# Patient Record
Sex: FEMALE | Race: WHITE | NOT HISPANIC OR LATINO | Employment: OTHER | URBAN - METROPOLITAN AREA
[De-identification: names, ages, dates, MRNs, and addresses within clinical notes are randomized per-mention and may not be internally consistent; named-entity substitution may affect disease eponyms.]

---

## 2023-02-13 PROBLEM — M17.12 LEFT KNEE DJD: Status: ACTIVE | Noted: 2023-02-13

## 2023-02-13 PROBLEM — K21.9 GERD WITHOUT ESOPHAGITIS: Status: ACTIVE | Noted: 2023-02-13

## 2023-02-13 PROBLEM — I10 BENIGN HYPERTENSION: Status: ACTIVE | Noted: 2023-02-13

## 2023-02-13 PROBLEM — N28.1 COMPLEX RENAL CYST: Status: ACTIVE | Noted: 2023-02-13

## 2023-02-13 PROBLEM — E66.811 CLASS 1 OBESITY WITH BODY MASS INDEX (BMI) OF 30.0 TO 30.9 IN ADULT: Status: ACTIVE | Noted: 2023-02-13

## 2023-02-13 PROBLEM — M85.80 OSTEOPENIA: Status: ACTIVE | Noted: 2023-02-13

## 2023-02-13 PROBLEM — E66.9 OBESITY: Status: ACTIVE | Noted: 2023-02-13

## 2023-02-13 PROBLEM — E66.9 MILD OBESITY: Status: ACTIVE | Noted: 2023-02-13

## 2023-02-13 PROBLEM — E87.6 HYPOKALEMIA: Status: ACTIVE | Noted: 2023-02-13

## 2023-02-13 PROBLEM — I83.93 VARICOSE VEINS OF LEGS: Status: ACTIVE | Noted: 2023-02-13

## 2023-02-13 PROBLEM — G45.9 TIA (TRANSIENT ISCHEMIC ATTACK): Status: ACTIVE | Noted: 2023-02-13

## 2023-02-13 PROBLEM — E78.01 FAMILIAL HYPERCHOLESTEREMIA: Status: ACTIVE | Noted: 2023-02-13

## 2023-02-13 PROBLEM — E66.3 OVERWEIGHT WITH BODY MASS INDEX (BMI) OF 29 TO 29.9 IN ADULT: Status: ACTIVE | Noted: 2023-02-13

## 2023-02-13 PROBLEM — I10 HYPERTENSION: Status: ACTIVE | Noted: 2023-02-13

## 2023-02-13 PROBLEM — N28.1 SIMPLE CYST OF KIDNEY: Status: ACTIVE | Noted: 2023-02-13

## 2023-02-13 PROBLEM — E66.9 CLASS 1 OBESITY WITH BODY MASS INDEX (BMI) OF 30.0 TO 30.9 IN ADULT: Status: ACTIVE | Noted: 2023-02-13

## 2023-02-13 PROBLEM — M79.605 PAIN IN BOTH LOWER EXTREMITIES: Status: ACTIVE | Noted: 2023-02-13

## 2023-02-13 PROBLEM — M79.604 PAIN IN BOTH LOWER EXTREMITIES: Status: ACTIVE | Noted: 2023-02-13

## 2023-02-13 PROBLEM — J06.9 VIRAL URI WITH COUGH: Status: ACTIVE | Noted: 2023-02-13

## 2023-02-13 PROBLEM — R10.11 RIGHT UPPER QUADRANT ABDOMINAL PAIN: Status: ACTIVE | Noted: 2023-02-13

## 2023-02-13 PROBLEM — R42 VERTIGO: Status: ACTIVE | Noted: 2023-02-13

## 2023-02-13 PROBLEM — M54.50 LOW BACK PAIN: Status: ACTIVE | Noted: 2023-02-13

## 2023-02-13 PROBLEM — G47.00 INSOMNIA: Status: ACTIVE | Noted: 2023-02-13

## 2023-02-13 PROBLEM — E78.00 HYPERCHOLESTEROLEMIA: Status: ACTIVE | Noted: 2023-02-13

## 2023-02-13 PROBLEM — M25.569 JOINT PAIN, KNEE: Status: ACTIVE | Noted: 2023-02-13

## 2023-02-13 RX ORDER — OLMESARTAN MEDOXOMIL AND HYDROCHLOROTHIAZIDE 20/12.5 20; 12.5 MG/1; MG/1
1 TABLET ORAL
COMMUNITY
Start: 2019-09-24 | End: 2023-05-08

## 2023-03-24 ENCOUNTER — LAB (OUTPATIENT)
Dept: LAB | Facility: LAB | Age: 78
End: 2023-03-24
Payer: MEDICARE

## 2023-03-24 ENCOUNTER — OFFICE VISIT (OUTPATIENT)
Dept: PRIMARY CARE | Facility: CLINIC | Age: 78
End: 2023-03-24
Payer: MEDICARE

## 2023-03-24 VITALS
SYSTOLIC BLOOD PRESSURE: 111 MMHG | WEIGHT: 161.4 LBS | DIASTOLIC BLOOD PRESSURE: 66 MMHG | BODY MASS INDEX: 28.6 KG/M2 | OXYGEN SATURATION: 96 % | HEART RATE: 74 BPM | HEIGHT: 63 IN | RESPIRATION RATE: 18 BRPM

## 2023-03-24 DIAGNOSIS — R53.83 FATIGUE, UNSPECIFIED TYPE: ICD-10-CM

## 2023-03-24 DIAGNOSIS — I10 BENIGN HYPERTENSION: ICD-10-CM

## 2023-03-24 DIAGNOSIS — E55.9 VITAMIN D DEFICIENCY: ICD-10-CM

## 2023-03-24 DIAGNOSIS — Z00.00 HEALTH CARE MAINTENANCE: ICD-10-CM

## 2023-03-24 DIAGNOSIS — E78.01 FAMILIAL HYPERCHOLESTEREMIA: ICD-10-CM

## 2023-03-24 DIAGNOSIS — G47.09 OTHER INSOMNIA: ICD-10-CM

## 2023-03-24 LAB
ALANINE AMINOTRANSFERASE (SGPT) (U/L) IN SER/PLAS: 15 U/L (ref 7–45)
ALBUMIN (G/DL) IN SER/PLAS: 4.4 G/DL (ref 3.4–5)
ALKALINE PHOSPHATASE (U/L) IN SER/PLAS: 53 U/L (ref 33–136)
ANION GAP IN SER/PLAS: 13 MMOL/L (ref 10–20)
ASPARTATE AMINOTRANSFERASE (SGOT) (U/L) IN SER/PLAS: 24 U/L (ref 9–39)
BASOPHILS (10*3/UL) IN BLOOD BY AUTOMATED COUNT: 0.08 X10E9/L (ref 0–0.1)
BASOPHILS/100 LEUKOCYTES IN BLOOD BY AUTOMATED COUNT: 1.1 % (ref 0–2)
BILIRUBIN TOTAL (MG/DL) IN SER/PLAS: 0.9 MG/DL (ref 0–1.2)
CALCIUM (MG/DL) IN SER/PLAS: 9.9 MG/DL (ref 8.6–10.3)
CARBON DIOXIDE, TOTAL (MMOL/L) IN SER/PLAS: 28 MMOL/L (ref 21–32)
CHLORIDE (MMOL/L) IN SER/PLAS: 100 MMOL/L (ref 98–107)
CHOLESTEROL (MG/DL) IN SER/PLAS: 202 MG/DL (ref 0–199)
CHOLESTEROL IN HDL (MG/DL) IN SER/PLAS: 56.9 MG/DL
CHOLESTEROL/HDL RATIO: 3.6
CREATININE (MG/DL) IN SER/PLAS: 0.79 MG/DL (ref 0.5–1.05)
EOSINOPHILS (10*3/UL) IN BLOOD BY AUTOMATED COUNT: 0.12 X10E9/L (ref 0–0.4)
EOSINOPHILS/100 LEUKOCYTES IN BLOOD BY AUTOMATED COUNT: 1.7 % (ref 0–6)
ERYTHROCYTE DISTRIBUTION WIDTH (RATIO) BY AUTOMATED COUNT: 12.2 % (ref 11.5–14.5)
ERYTHROCYTE MEAN CORPUSCULAR HEMOGLOBIN CONCENTRATION (G/DL) BY AUTOMATED: 33.2 G/DL (ref 32–36)
ERYTHROCYTE MEAN CORPUSCULAR VOLUME (FL) BY AUTOMATED COUNT: 93 FL (ref 80–100)
ERYTHROCYTES (10*6/UL) IN BLOOD BY AUTOMATED COUNT: 4.2 X10E12/L (ref 4–5.2)
GFR FEMALE: 78 ML/MIN/1.73M2
GLUCOSE (MG/DL) IN SER/PLAS: 90 MG/DL (ref 74–99)
HEMATOCRIT (%) IN BLOOD BY AUTOMATED COUNT: 38.9 % (ref 36–46)
HEMOGLOBIN (G/DL) IN BLOOD: 12.9 G/DL (ref 12–16)
IMMATURE GRANULOCYTES/100 LEUKOCYTES IN BLOOD BY AUTOMATED COUNT: 0.4 % (ref 0–0.9)
LDL: 132 MG/DL (ref 0–99)
LEUKOCYTES (10*3/UL) IN BLOOD BY AUTOMATED COUNT: 7.2 X10E9/L (ref 4.4–11.3)
LYMPHOCYTES (10*3/UL) IN BLOOD BY AUTOMATED COUNT: 2.22 X10E9/L (ref 0.8–3)
LYMPHOCYTES/100 LEUKOCYTES IN BLOOD BY AUTOMATED COUNT: 30.8 % (ref 13–44)
MONOCYTES (10*3/UL) IN BLOOD BY AUTOMATED COUNT: 0.54 X10E9/L (ref 0.05–0.8)
MONOCYTES/100 LEUKOCYTES IN BLOOD BY AUTOMATED COUNT: 7.5 % (ref 2–10)
NEUTROPHILS (10*3/UL) IN BLOOD BY AUTOMATED COUNT: 4.21 X10E9/L (ref 1.6–5.5)
NEUTROPHILS/100 LEUKOCYTES IN BLOOD BY AUTOMATED COUNT: 58.5 % (ref 40–80)
PLATELETS (10*3/UL) IN BLOOD AUTOMATED COUNT: 239 X10E9/L (ref 150–450)
POTASSIUM (MMOL/L) IN SER/PLAS: 3.9 MMOL/L (ref 3.5–5.3)
PROTEIN TOTAL: 7.1 G/DL (ref 6.4–8.2)
SODIUM (MMOL/L) IN SER/PLAS: 137 MMOL/L (ref 136–145)
THYROTROPIN (MIU/L) IN SER/PLAS BY DETECTION LIMIT <= 0.05 MIU/L: 0.88 MIU/L (ref 0.44–3.98)
TRIGLYCERIDE (MG/DL) IN SER/PLAS: 66 MG/DL (ref 0–149)
UREA NITROGEN (MG/DL) IN SER/PLAS: 15 MG/DL (ref 6–23)
VLDL: 13 MG/DL (ref 0–40)

## 2023-03-24 PROCEDURE — 82607 VITAMIN B-12: CPT

## 2023-03-24 PROCEDURE — 3074F SYST BP LT 130 MM HG: CPT | Performed by: INTERNAL MEDICINE

## 2023-03-24 PROCEDURE — 99213 OFFICE O/P EST LOW 20 MIN: CPT | Performed by: INTERNAL MEDICINE

## 2023-03-24 PROCEDURE — 1036F TOBACCO NON-USER: CPT | Performed by: INTERNAL MEDICINE

## 2023-03-24 PROCEDURE — 36415 COLL VENOUS BLD VENIPUNCTURE: CPT

## 2023-03-24 PROCEDURE — 80061 LIPID PANEL: CPT

## 2023-03-24 PROCEDURE — 85025 COMPLETE CBC W/AUTO DIFF WBC: CPT

## 2023-03-24 PROCEDURE — 80053 COMPREHEN METABOLIC PANEL: CPT

## 2023-03-24 PROCEDURE — 1159F MED LIST DOCD IN RCRD: CPT | Performed by: INTERNAL MEDICINE

## 2023-03-24 PROCEDURE — 3078F DIAST BP <80 MM HG: CPT | Performed by: INTERNAL MEDICINE

## 2023-03-24 PROCEDURE — 82306 VITAMIN D 25 HYDROXY: CPT

## 2023-03-24 PROCEDURE — 84443 ASSAY THYROID STIM HORMONE: CPT

## 2023-03-24 ASSESSMENT — PAIN SCALES - GENERAL: PAINLEVEL: 0-NO PAIN

## 2023-03-24 ASSESSMENT — PATIENT HEALTH QUESTIONNAIRE - PHQ9
2. FEELING DOWN, DEPRESSED OR HOPELESS: NOT AT ALL
1. LITTLE INTEREST OR PLEASURE IN DOING THINGS: NOT AT ALL
SUM OF ALL RESPONSES TO PHQ9 QUESTIONS 1 AND 2: 0

## 2023-03-24 NOTE — PROGRESS NOTES
"  Chief Complaint   Patient presents with    Follow-up    Hypertension     HPI/Patient ID: Philomena Conway is a 77 y.o. female with PMH remarkable for osteopenia, HLD, varicose veins, HTN who presents to the office today for a check up.  -- pt reports that she is feeling more tired than usual for a couple months. Reports insomnia at times.   -- primary caregiver of her  and he is dependent on ADL's.   - murmur on exam. Denies SOB, CP, etc. Will get an echo in the future for a baseline or sooner if symptomatic.      12 point review of systems negative unless stated above in HPI    Review of Systems:  Constitutional: No fever, chills  Eye: No blurry vision  ENT: No nasal congestion  Respiratory: No cough, SOB,  Cardiac: No chest pain or palpitations, dyspnea on exertion or orthopnea  Gastrointestinal: No abdominal pain, nausea, vomiting, diarrhea, melena, hemoptysis or hematochezia.  Genitourinary: No dysuria, hematuria, frequency, urgency  Musculoskeletal: No arthralgia, myalgia, stiffness, weakness  Neurological: No dizziness, lightheadness, blurry vision, headache or syncope  Psychiatric: + history of anxiety, depression, insomnia No hallucinations, suicidal/homicidal ideation  Skin: No rash or pruritus, ulcers  Endocrine: No heat or cold intolerance, polyuria, polydipsia  Hematologic: No bruising, petechiae     Objective   /66   Pulse 74   Resp 18   Ht 1.6 m (5' 3\")   Wt 73.2 kg (161 lb 6.4 oz)   SpO2 96%   BMI 28.59 kg/m²     Physical Exam:  General: Alert and oriented x3, well nourished, no acute distress.  Eye: PERRL, EOMI, normal conjunctiva.  HENT: Normocephalic, clear tympanic membranes, moist oral mucosa, no sinus tenderness.  Neck: Supple, non-tender, no carotid bruits, no JVD, no lymphadenopathy.  Lungs: Clear to auscultation, non-labored respiration.  Heart: Normal rate, regular rhythm, slight murmur, gallop or edema.  Abdomen: Soft, non-tender, non-distended, normal bowel sounds, no " masses.  Musculoskeletal: Normal range of motion and strength, no tenderness or swelling.  Skin: Skin is warm, dry and pink, no rashes or lesions.  Neurologic: Alert & Oriented x3, intact senses, motor, response and reflexes ok, normal strength, CN II-XII intact.  Psychiatric: Cooperative, appropriate mood and affect.   Current Outpatient Medications on File Prior to Visit   Medication Sig Dispense Refill    olmesartan-hydrochlorothiazide (BENIcar HCT) 20-12.5 mg tablet Take 1 tablet by mouth once every day.       No current facility-administered medications on file prior to visit.      Assessment/Plan   Diagnoses and all orders for this visit:  Health care maintenance  Comments:  - fu in 4m or sooner if needed  - check labs  - we will call you with the results  Orders:  -     CBC and Auto Differential; Future  -     Comprehensive metabolic panel; Future  -     Tsh With Reflex To Free T4 If Abnormal; Future  -     Lipid panel; Future  -     Vitamin B12; Future  -     Vitamin D 25-Hydroxy,Total; Future  Benign hypertension  Comments:  - bp stable  - c/w benicar  Orders:  -     CBC and Auto Differential; Future  -     Comprehensive metabolic panel; Future  -     Tsh With Reflex To Free T4 If Abnormal; Future  -     Lipid panel; Future  -     Vitamin B12; Future  -     Vitamin D 25-Hydroxy,Total; Future  Familial hypercholesteremia  Comments:  - will check lipid panel  - not on medication  Orders:  -     CBC and Auto Differential; Future  -     Comprehensive metabolic panel; Future  -     Tsh With Reflex To Free T4 If Abnormal; Future  -     Lipid panel; Future  -     Vitamin B12; Future  -     Vitamin D 25-Hydroxy,Total; Future  Other insomnia  Comments:  - most likely 2/2 caregiver strain  - will check vitamin levels, CBC, CMP,  Orders:  -     CBC and Auto Differential; Future  -     Comprehensive metabolic panel; Future  -     Tsh With Reflex To Free T4 If Abnormal; Future  -     Lipid panel; Future  -     Vitamin  B12; Future  -     Vitamin D 25-Hydroxy,Total; Future  Fatigue, unspecified type  Comments:  - see insomnia  Orders:  -     Vitamin B12; Future  -     Vitamin D 25-Hydroxy,Total; Future  Vitamin D deficiency  Comments:  - at risk for  - check level  Orders:  -     Vitamin D 25-Hydroxy,Total; Future        ------  Written by Harriet Quesada, acting as a scribe for Dr. Nguyen. This note accurately reflects the work and decisions made by Dr. Nguyen.     I, Dr. Nguyen, attest all medical record entries made by the scribe were under my direction and were personally dictated by me. I have reviewed the chart and agree that the record accurately reflects my performance of the history, physical exam, and assessment and plan.

## 2023-03-25 LAB
CALCIDIOL (25 OH VITAMIN D3) (NG/ML) IN SER/PLAS: 81 NG/ML
COBALAMIN (VITAMIN B12) (PG/ML) IN SER/PLAS: 673 PG/ML (ref 211–911)

## 2023-05-06 DIAGNOSIS — I10 ESSENTIAL (PRIMARY) HYPERTENSION: ICD-10-CM

## 2023-05-08 RX ORDER — OLMESARTAN MEDOXOMIL AND HYDROCHLOROTHIAZIDE 20/12.5 20; 12.5 MG/1; MG/1
TABLET ORAL
Qty: 90 TABLET | Refills: 0 | Status: SHIPPED | OUTPATIENT
Start: 2023-05-08 | End: 2023-08-03

## 2023-07-26 ENCOUNTER — OFFICE VISIT (OUTPATIENT)
Dept: PRIMARY CARE | Facility: CLINIC | Age: 78
End: 2023-07-26
Payer: MEDICARE

## 2023-07-26 VITALS
BODY MASS INDEX: 29.13 KG/M2 | DIASTOLIC BLOOD PRESSURE: 68 MMHG | OXYGEN SATURATION: 93 % | HEIGHT: 63 IN | RESPIRATION RATE: 18 BRPM | HEART RATE: 85 BPM | SYSTOLIC BLOOD PRESSURE: 104 MMHG | WEIGHT: 164.4 LBS

## 2023-07-26 DIAGNOSIS — E78.00 HYPERCHOLESTEROLEMIA: ICD-10-CM

## 2023-07-26 DIAGNOSIS — M85.80 OSTEOPENIA, UNSPECIFIED LOCATION: ICD-10-CM

## 2023-07-26 DIAGNOSIS — Z00.00 MEDICARE ANNUAL WELLNESS VISIT, SUBSEQUENT: ICD-10-CM

## 2023-07-26 DIAGNOSIS — I10 HYPERTENSION, UNSPECIFIED TYPE: ICD-10-CM

## 2023-07-26 PROCEDURE — 1126F AMNT PAIN NOTED NONE PRSNT: CPT | Performed by: INTERNAL MEDICINE

## 2023-07-26 PROCEDURE — 1159F MED LIST DOCD IN RCRD: CPT | Performed by: INTERNAL MEDICINE

## 2023-07-26 PROCEDURE — 1036F TOBACCO NON-USER: CPT | Performed by: INTERNAL MEDICINE

## 2023-07-26 PROCEDURE — 3074F SYST BP LT 130 MM HG: CPT | Performed by: INTERNAL MEDICINE

## 2023-07-26 PROCEDURE — 3078F DIAST BP <80 MM HG: CPT | Performed by: INTERNAL MEDICINE

## 2023-07-26 PROCEDURE — 1170F FXNL STATUS ASSESSED: CPT | Performed by: INTERNAL MEDICINE

## 2023-07-26 PROCEDURE — 99214 OFFICE O/P EST MOD 30 MIN: CPT | Performed by: INTERNAL MEDICINE

## 2023-07-26 PROCEDURE — G0439 PPPS, SUBSEQ VISIT: HCPCS | Performed by: INTERNAL MEDICINE

## 2023-07-26 ASSESSMENT — ACTIVITIES OF DAILY LIVING (ADL)
MANAGING_FINANCES: INDEPENDENT
BATHING: INDEPENDENT
TAKING_MEDICATION: INDEPENDENT
GROCERY_SHOPPING: INDEPENDENT
DRESSING: INDEPENDENT
DOING_HOUSEWORK: INDEPENDENT

## 2023-07-26 ASSESSMENT — PAIN SCALES - GENERAL: PAINLEVEL: 0-NO PAIN

## 2023-07-26 NOTE — PATIENT INSTRUCTIONS
It was great to see you in the office today! Here is what we discussed at your visit today:  Please continue to take your current medications   Follow up in four months

## 2023-07-26 NOTE — PROGRESS NOTES
"SUBJECTIVE: She states that she has been feeling very good. She states that she walks a lot and tends to her flower garden. She denies any SOB, chest pain or cough. She states she walks daily around the block near her home. She states she had baker's cyst on back of left knee which was drained in the past, but otherwise has no issues with her joints. She denies any issues with constipation or urinary issues. She denies any depression.     Philomena Conway is a 78 y.o. female with PMH remarkable for HTN, HLD, osteopenia, low back pain who presents to the office today for annual medicare wellness physical.     HEALTH MAINTENANCE:  Smoking: non smoker  Mammogram (40-75): 2019, refused further imaging on previous OV  Pap smear (21-65): n/a  Labs:  3/24/23, normal other than slightly elevated total cholesterol 202 ,   Colonoscopy (45-75): refuses  Lung cancer (55-80 + 30 pack year + smoking/quit in last 15 years):   DEXA (65+, q 2 years): 10/31/18 revealed osteopenia    SOCIAL HISTORY:  Social History     Tobacco Use    Smoking status: Never    Smokeless tobacco: Never   Substance Use Topics    Alcohol use: Never    Drug use: Never       IMMUNIZATIONS:  Immunization History   Administered Date(s) Administered    Influenza, seasonal, injectable 11/10/2022    Pfizer Purple Cap SARS-CoV-2 03/01/2021, 03/22/2021, 10/03/2021    Pneumococcal polysaccharide vaccine, 23-valent, age 2 years and older (PNEUMOVAX 23) 01/01/2012, 03/04/2013    Tdap vaccine, age 10 years and older (BOOSTRIX) 01/01/2012    Zoster, live 01/01/2013     REVIEW OF SYSTEMS:  Review of Systems  12 point review of systems negative unless stated above in HPI    ALLERGIES:  No Known Allergies    VITAL SIGNS:  Vitals:    07/26/23 0923   BP: 104/68   Pulse: 85   Resp: 18   SpO2: 93%   Weight: 74.6 kg (164 lb 6.4 oz)   Height: 1.6 m (5' 3\")     PHYSICAL EXAM:  General: Alert and oriented x3, well nourished, no acute distress.  Eye: PERRL, EOMI, normal " conjunctiva.  HENT: Normocephalic, clear tympanic membranes, moist oral mucosa, no sinus tenderness.  Neck: Supple, non-tender, no carotid bruits, no JVD, no lymphadenopathy.  Lungs: Clear to auscultation, non-labored respiration.  Heart: Normal rate, regular rhythm, no murmur, gallop or edema.  Abdomen: Soft, non-tender, non-distended, normal bowel sounds, no masses.  Musculoskeletal: Normal range of motion and strength, no tenderness or swelling.  Skin: Skin is warm, dry and pink, no rashes or lesions.  Neurologic: Alert & Oriented x3, intact senses, motor, response and reflexes ok, normal strength, CN II-XII intact.  Psychiatric: Cooperative, appropriate mood and affect.    MEDICATIONS:  Current Outpatient Medications on File Prior to Visit   Medication Sig Dispense Refill    olmesartan-hydrochlorothiazide (BENIcar HCT) 20-12.5 mg tablet TAKE 1 TABLET BY MOUTH EVERY DAY 90 tablet 0     No current facility-administered medications on file prior to visit.      LABORATORY DATA:  Lab Results   Component Value Date    WBC 7.2 03/24/2023    HGB 12.9 03/24/2023    HCT 38.9 03/24/2023     03/24/2023    CHOL 202 (H) 03/24/2023    TRIG 66 03/24/2023    HDL 56.9 03/24/2023    ALT 15 03/24/2023    AST 24 03/24/2023     03/24/2023    K 3.9 03/24/2023     03/24/2023    CREATININE 0.79 03/24/2023    BUN 15 03/24/2023    CO2 28 03/24/2023    TSH 0.88 03/24/2023       ASSESSMENT AND PLAN:  Health Maintenance: Annual Medicare physical   - reviewed most recent labs from 3/24/23, normal other than slightly elevated total cholesterol 202 ,   - she states she walks daily  - her most recent Dexa scan on 10/31/18 revealed osteopenia, she is taking calcium with vitamin D, advised to continue to take this twice daily  - her BP is good today, 104/68, c/w Benicar  - she does not wish to have any further mammograms  - she refuses colonoscopy, denies family history of colon cancer  - she denies any changes in color,  consistency, caliber of stool  - she reports she is eating well  - Follow up in four months  --------------------  Written by Ro Pritchett LPN, acting as a scribe for Dr. Nguyen. This note accurately reflects the work and decisions made by Dr. Nguyen.     I, Dr. Nguyen, attest all medical record entries made by the scribe were under my direction and were personally dictated by me. I have reviewed the chart and agree that the record accurately reflects my performance of the history, physical exam, and assessment and plan.

## 2023-08-03 DIAGNOSIS — I10 ESSENTIAL (PRIMARY) HYPERTENSION: ICD-10-CM

## 2023-08-03 RX ORDER — OLMESARTAN MEDOXOMIL AND HYDROCHLOROTHIAZIDE 20/12.5 20; 12.5 MG/1; MG/1
TABLET ORAL
Qty: 90 TABLET | Refills: 0 | Status: SHIPPED | OUTPATIENT
Start: 2023-08-03 | End: 2023-10-31

## 2023-09-01 NOTE — PROGRESS NOTES
Subjective   Patient ID:   Philomena Conway is a 78 y.o. female who presents for Flu Vaccine.  HPI  HTN:  Taking Olmesartan-HCTZ.  BP today is 118/60.  Denies dizziness, headaches.     HLD:  Declined statins in the past.  Last checked March 2023.    Osteopenia:  Taking vitamin D and calcium.     Bilateral leg pains/back pain:  Has seen PT now.  This is manageable.  Consider imaging/ortho spine.  Robaxin has not helped.  Seeing a chiropractor, but this isn't helping.  Symptoms x years.  Has tried NSAIDs without success.     Varicose veins:  I've referred to vascular in the past.  Symptoms x years.  Both legs.  Compression socks have not helped.  Very painful.  Getting worse.  Interested in vein procedures.    Skin rash:  Left elbow.  Has tried various OTC creams without success.  Symptoms x 1 month.    GERD:  Having increased acid reflux.  Lots of belching.  Not on medication for this.     Health maintenance:  Smoking: Never a smoker.  Labs: March 2023.  DEXA (65+, q 2 years): Oct 2018.   Influenza:     Review of Systems  12 point review of systems negative unless stated above in HPI    Vitals:    09/07/23 1148   BP: 118/60   Pulse: 72     Physical Exam  General: Alert and oriented, well nourished, no acute distress.  Lungs: Clear to auscultation, non-labored respiration.  Heart: Normal rate, regular rhythm, no murmur, gallop or edema.  Neurologic: Awake, alert, and oriented X3, CN II-XII intact.  Psychiatric: Cooperative, appropriate mood and affect.  Skin: Flexural eczema in left elbow crease    Assessment/Plan   High dose flu vaccine given today.  I have sent in Triamcinolone cream for the rash.  I have also sent in Prilosec for the acid reflux.  If symptoms persist or worsen despite current plan of care, please contact your healthcare provider for further evaluation.  Patient instructed to contact the office if there are any questions regarding their care or treatment.   [facility] [phone].    [facility] [phone].  Continue the same medications.  Chronic conditions are stable.  Call with questions or concerns.    Follow up  6 months  Diagnoses and all orders for this visit:  Benign hypertension  Hypercholesterolemia  Osteopenia, unspecified location  Varicose veins of both lower extremities, unspecified whether complicated  Pain in both lower extremities  Flu vaccine need  -     Flu vaccine, quadrivalent, high-dose, preservative free, age 65y+ (FLUZONE)  Gastroesophageal reflux disease without esophagitis  -     omeprazole (PriLOSEC) 20 mg DR capsule; Take 1 capsule (20 mg) by mouth once daily. Do not crush or chew.  Flexural eczema  -     triamcinolone (Kenalog) 0.1 % cream; Apply topically 2 times a day. Apply to affected area 1-2 times daily as needed.

## 2023-09-07 ENCOUNTER — OFFICE VISIT (OUTPATIENT)
Dept: PRIMARY CARE | Facility: CLINIC | Age: 78
End: 2023-09-07
Payer: MEDICARE

## 2023-09-07 VITALS
WEIGHT: 165.2 LBS | SYSTOLIC BLOOD PRESSURE: 118 MMHG | DIASTOLIC BLOOD PRESSURE: 60 MMHG | HEART RATE: 72 BPM | BODY MASS INDEX: 29.27 KG/M2 | HEIGHT: 63 IN

## 2023-09-07 DIAGNOSIS — K21.9 GASTROESOPHAGEAL REFLUX DISEASE WITHOUT ESOPHAGITIS: ICD-10-CM

## 2023-09-07 DIAGNOSIS — Z23 FLU VACCINE NEED: ICD-10-CM

## 2023-09-07 DIAGNOSIS — M85.80 OSTEOPENIA, UNSPECIFIED LOCATION: ICD-10-CM

## 2023-09-07 DIAGNOSIS — I10 BENIGN HYPERTENSION: Primary | ICD-10-CM

## 2023-09-07 DIAGNOSIS — I83.93 VARICOSE VEINS OF BOTH LOWER EXTREMITIES, UNSPECIFIED WHETHER COMPLICATED: ICD-10-CM

## 2023-09-07 DIAGNOSIS — M79.604 PAIN IN BOTH LOWER EXTREMITIES: ICD-10-CM

## 2023-09-07 DIAGNOSIS — M79.605 PAIN IN BOTH LOWER EXTREMITIES: ICD-10-CM

## 2023-09-07 DIAGNOSIS — E78.00 HYPERCHOLESTEROLEMIA: ICD-10-CM

## 2023-09-07 DIAGNOSIS — L20.82 FLEXURAL ECZEMA: ICD-10-CM

## 2023-09-07 PROCEDURE — 1159F MED LIST DOCD IN RCRD: CPT | Performed by: PHYSICIAN ASSISTANT

## 2023-09-07 PROCEDURE — 99214 OFFICE O/P EST MOD 30 MIN: CPT | Performed by: PHYSICIAN ASSISTANT

## 2023-09-07 PROCEDURE — 90662 IIV NO PRSV INCREASED AG IM: CPT | Performed by: PHYSICIAN ASSISTANT

## 2023-09-07 PROCEDURE — 1036F TOBACCO NON-USER: CPT | Performed by: PHYSICIAN ASSISTANT

## 2023-09-07 PROCEDURE — 3078F DIAST BP <80 MM HG: CPT | Performed by: PHYSICIAN ASSISTANT

## 2023-09-07 PROCEDURE — 3074F SYST BP LT 130 MM HG: CPT | Performed by: PHYSICIAN ASSISTANT

## 2023-09-07 PROCEDURE — G0008 ADMIN INFLUENZA VIRUS VAC: HCPCS | Performed by: PHYSICIAN ASSISTANT

## 2023-09-07 PROCEDURE — 1160F RVW MEDS BY RX/DR IN RCRD: CPT | Performed by: PHYSICIAN ASSISTANT

## 2023-09-07 PROCEDURE — 1126F AMNT PAIN NOTED NONE PRSNT: CPT | Performed by: PHYSICIAN ASSISTANT

## 2023-09-07 RX ORDER — SYRING-NEEDL,DISP,INSUL,0.3 ML 29 G X1/2"
SYRINGE, EMPTY DISPOSABLE MISCELLANEOUS ONCE
COMMUNITY
End: 2024-04-30 | Stop reason: ALTCHOICE

## 2023-09-07 RX ORDER — TRIAMCINOLONE ACETONIDE 1 MG/G
CREAM TOPICAL 2 TIMES DAILY
Qty: 45 G | Refills: 2 | Status: SHIPPED | OUTPATIENT
Start: 2023-09-07

## 2023-09-07 RX ORDER — OMEPRAZOLE 20 MG/1
20 CAPSULE, DELAYED RELEASE ORAL DAILY
Qty: 90 CAPSULE | Refills: 1 | Status: SHIPPED | OUTPATIENT
Start: 2023-09-07 | End: 2024-02-28

## 2023-09-07 ASSESSMENT — PATIENT HEALTH QUESTIONNAIRE - PHQ9
SUM OF ALL RESPONSES TO PHQ9 QUESTIONS 1 AND 2: 0
1. LITTLE INTEREST OR PLEASURE IN DOING THINGS: NOT AT ALL
2. FEELING DOWN, DEPRESSED OR HOPELESS: NOT AT ALL

## 2024-01-24 DIAGNOSIS — I10 ESSENTIAL (PRIMARY) HYPERTENSION: ICD-10-CM

## 2024-01-24 RX ORDER — OLMESARTAN MEDOXOMIL AND HYDROCHLOROTHIAZIDE 20/12.5 20; 12.5 MG/1; MG/1
TABLET ORAL
Qty: 90 TABLET | Refills: 0 | Status: SHIPPED | OUTPATIENT
Start: 2024-01-24 | End: 2024-04-19

## 2024-02-28 DIAGNOSIS — K21.9 GASTROESOPHAGEAL REFLUX DISEASE WITHOUT ESOPHAGITIS: ICD-10-CM

## 2024-02-28 RX ORDER — OMEPRAZOLE 20 MG/1
20 CAPSULE, DELAYED RELEASE ORAL DAILY
Qty: 90 CAPSULE | Refills: 1 | Status: SHIPPED | OUTPATIENT
Start: 2024-02-28 | End: 2024-08-26

## 2024-04-19 DIAGNOSIS — I10 ESSENTIAL (PRIMARY) HYPERTENSION: ICD-10-CM

## 2024-04-19 RX ORDER — OLMESARTAN MEDOXOMIL AND HYDROCHLOROTHIAZIDE 20/12.5 20; 12.5 MG/1; MG/1
TABLET ORAL
Qty: 90 TABLET | Refills: 0 | Status: SHIPPED | OUTPATIENT
Start: 2024-04-19

## 2024-04-30 ENCOUNTER — OFFICE VISIT (OUTPATIENT)
Dept: PRIMARY CARE | Facility: CLINIC | Age: 79
End: 2024-04-30
Payer: MEDICARE

## 2024-04-30 VITALS
TEMPERATURE: 99.6 F | WEIGHT: 162.6 LBS | BODY MASS INDEX: 29.92 KG/M2 | HEART RATE: 92 BPM | OXYGEN SATURATION: 94 % | HEIGHT: 62 IN | RESPIRATION RATE: 18 BRPM | SYSTOLIC BLOOD PRESSURE: 98 MMHG | DIASTOLIC BLOOD PRESSURE: 63 MMHG

## 2024-04-30 DIAGNOSIS — J06.9 UPPER RESPIRATORY TRACT INFECTION, UNSPECIFIED TYPE: ICD-10-CM

## 2024-04-30 DIAGNOSIS — Z00.00 HEALTHCARE MAINTENANCE: ICD-10-CM

## 2024-04-30 PROCEDURE — 3074F SYST BP LT 130 MM HG: CPT | Performed by: INTERNAL MEDICINE

## 2024-04-30 PROCEDURE — 99213 OFFICE O/P EST LOW 20 MIN: CPT | Performed by: INTERNAL MEDICINE

## 2024-04-30 PROCEDURE — 1160F RVW MEDS BY RX/DR IN RCRD: CPT | Performed by: INTERNAL MEDICINE

## 2024-04-30 PROCEDURE — 1125F AMNT PAIN NOTED PAIN PRSNT: CPT | Performed by: INTERNAL MEDICINE

## 2024-04-30 PROCEDURE — 1123F ACP DISCUSS/DSCN MKR DOCD: CPT | Performed by: INTERNAL MEDICINE

## 2024-04-30 PROCEDURE — 1158F ADVNC CARE PLAN TLK DOCD: CPT | Performed by: INTERNAL MEDICINE

## 2024-04-30 PROCEDURE — 3078F DIAST BP <80 MM HG: CPT | Performed by: INTERNAL MEDICINE

## 2024-04-30 PROCEDURE — 1036F TOBACCO NON-USER: CPT | Performed by: INTERNAL MEDICINE

## 2024-04-30 PROCEDURE — 1159F MED LIST DOCD IN RCRD: CPT | Performed by: INTERNAL MEDICINE

## 2024-04-30 RX ORDER — AMOXICILLIN 500 MG/1
500 CAPSULE ORAL EVERY 8 HOURS SCHEDULED
Qty: 30 CAPSULE | Refills: 0 | Status: SHIPPED | OUTPATIENT
Start: 2024-04-30 | End: 2024-05-10

## 2024-04-30 RX ORDER — BENZONATATE 200 MG/1
200 CAPSULE ORAL 3 TIMES DAILY PRN
Qty: 42 CAPSULE | Refills: 0 | Status: SHIPPED | OUTPATIENT
Start: 2024-04-30 | End: 2024-05-30

## 2024-04-30 RX ORDER — GUAIFENESIN 600 MG/1
600 TABLET, EXTENDED RELEASE ORAL 2 TIMES DAILY
Qty: 14 TABLET | Refills: 0 | Status: SHIPPED | OUTPATIENT
Start: 2024-04-30 | End: 2024-05-07

## 2024-04-30 RX ORDER — FLUTICASONE PROPIONATE 50 MCG
1 SPRAY, SUSPENSION (ML) NASAL DAILY
Qty: 16 G | Refills: 0 | Status: SHIPPED | OUTPATIENT
Start: 2024-04-30 | End: 2024-05-07

## 2024-04-30 ASSESSMENT — PAIN SCALES - GENERAL: PAINLEVEL: 5

## 2024-04-30 ASSESSMENT — ENCOUNTER SYMPTOMS
RHINORRHEA: 1
COUGH: 1
WEAKNESS: 1
CHILLS: 1
SHORTNESS OF BREATH: 1
SINUS PRESSURE: 1
FATIGUE: 1

## 2024-04-30 NOTE — PROGRESS NOTES
"Patient ID:   Philomena Conway is a 78 y.o. female with PMH remarkable for HTN, GERD,  HLD, osteopenia, low back pain who presents to the office today for Follow-up (Deep cough, dry burning throat, headache, rhinitis, no phlegm, weakness, loose bowels, fatigue/).    Started on Saturday - deep, dry, nonproductive cough  Ear pressure, weakness and fatigue. Mild headache.  Diarrhea started today.      Social History     Tobacco Use    Smoking status: Never    Smokeless tobacco: Never   Substance Use Topics    Alcohol use: Never    Drug use: Never     Review of Systems   Constitutional:  Positive for chills, fatigue and fever.   HENT:  Positive for congestion, postnasal drip, rhinorrhea and sinus pressure.    Respiratory:  Positive for cough and shortness of breath.    Gastrointestinal:  Positive for diarrhea.   Neurological:  Positive for weakness and headaches.   All other systems reviewed and are negative.    Visit Vitals  BP 98/63   Pulse 92   Temp 37.6 °C (99.6 °F)   Resp 18   Ht 1.575 m (5' 2\")   Wt 73.8 kg (162 lb 9.6 oz)   SpO2 94%   BMI 29.74 kg/m²   Smoking Status Never   BSA 1.8 m²     Physical Exam  Vitals reviewed.   Constitutional:       General: She is not in acute distress.     Appearance: Normal appearance. She is not ill-appearing.   HENT:      Head: Normocephalic and atraumatic.      Right Ear: Tympanic membrane and external ear normal.      Left Ear: Tympanic membrane and external ear normal.      Nose: Congestion and rhinorrhea present.      Mouth/Throat:      Mouth: Mucous membranes are moist.      Pharynx: Oropharynx is clear.   Eyes:      Conjunctiva/sclera: Conjunctivae normal.      Pupils: Pupils are equal, round, and reactive to light.   Cardiovascular:      Rate and Rhythm: Normal rate and regular rhythm.      Heart sounds: Normal heart sounds. No murmur heard.  Pulmonary:      Effort: Pulmonary effort is normal. No respiratory distress.      Breath sounds: Decreased breath sounds present. No " wheezing.   Abdominal:      General: Bowel sounds are normal. There is no distension.      Palpations: Abdomen is soft. There is no mass.      Tenderness: There is generalized abdominal tenderness.   Musculoskeletal:         General: Normal range of motion.      Cervical back: Normal range of motion and neck supple.   Skin:     General: Skin is warm and dry.   Neurological:      General: No focal deficit present.      Mental Status: She is alert and oriented to person, place, and time.      Sensory: No sensory deficit.      Motor: No weakness.      Coordination: Coordination normal.      Gait: Gait normal.   Psychiatric:         Mood and Affect: Mood normal.         Behavior: Behavior normal.       Current Outpatient Medications   Medication Instructions    amoxicillin (AMOXIL) 500 mg, oral, Every 8 hours scheduled    benzonatate (TESSALON) 200 mg, oral, 3 times daily PRN, Do not crush or chew.    fluticasone (Flonase) 50 mcg/actuation nasal spray 1 spray, Each Nostril, Daily, Shake gently. Before first use, prime pump. After use, clean tip and replace cap.    guaiFENesin (MUCINEX) 600 mg, oral, 2 times daily, Do not crush, chew, or split.    olmesartan-hydrochlorothiazide (BENIcar HCT) 20-12.5 mg tablet TAKE 1 TABLET BY MOUTH EVERY DAY    omeprazole (PRILOSEC) 20 mg, oral, Daily, Do not crush or chew.    triamcinolone (Kenalog) 0.1 % cream Topical, 2 times daily, Apply to affected area 1-2 times daily as needed.    vitamin D3-vitamin K2 1,250-200 mcg capsule oral      Lab Results   Component Value Date    WBC 7.2 03/24/2023    HGB 12.9 03/24/2023    HCT 38.9 03/24/2023     03/24/2023    CHOL 202 (H) 03/24/2023    TRIG 66 03/24/2023    HDL 56.9 03/24/2023    ALT 15 03/24/2023    AST 24 03/24/2023     03/24/2023    K 3.9 03/24/2023     03/24/2023    CREATININE 0.79 03/24/2023    BUN 15 03/24/2023    CO2 28 03/24/2023    TSH 0.88 03/24/2023     Assessment/Plan   Problem List Items Addressed This  Visit             ICD-10-CM    Upper respiratory tract infection J06.9     - start on mucinex 600mg BID, flonase both nostrils BID x7d  - start on PRN tessalon perles TID PRN  - start on amoxicillin 500mg TID x10d         Relevant Medications    fluticasone (Flonase) 50 mcg/actuation nasal spray    guaiFENesin (Mucinex) 600 mg 12 hr tablet    amoxicillin (Amoxil) 500 mg capsule    benzonatate (Tessalon) 200 mg capsule    Healthcare maintenance Z00.00    Relevant Orders    Full code (Completed)     --------------------  Written by Harriet Quesada RN, acting as a scribe for Dr. Nguyen. This note accurately reflects the work and decisions made by Dr. Nguyen.     I, Dr. Nguyen, attest all medical record entries made by the scribe were under my direction and were personally dictated by me. I have reviewed the chart and agree that the record accurately reflects my performance of the history, physical exam, and assessment and plan.

## 2024-05-01 PROBLEM — R10.11 RIGHT UPPER QUADRANT ABDOMINAL PAIN: Status: RESOLVED | Noted: 2023-02-13 | Resolved: 2024-05-01

## 2024-05-01 PROBLEM — E66.9 OBESITY: Status: RESOLVED | Noted: 2023-02-13 | Resolved: 2024-05-01

## 2024-05-01 PROBLEM — E66.9 CLASS 1 OBESITY WITH BODY MASS INDEX (BMI) OF 30.0 TO 30.9 IN ADULT: Status: RESOLVED | Noted: 2023-02-13 | Resolved: 2024-05-01

## 2024-05-01 PROBLEM — M17.12 LEFT KNEE DJD: Status: RESOLVED | Noted: 2023-02-13 | Resolved: 2024-05-01

## 2024-05-01 PROBLEM — E78.00 HYPERCHOLESTEROLEMIA: Status: RESOLVED | Noted: 2023-02-13 | Resolved: 2024-05-01

## 2024-05-01 PROBLEM — M54.30 SCIATICA: Status: ACTIVE | Noted: 2023-02-13

## 2024-05-01 PROBLEM — N28.1 COMPLEX RENAL CYST: Status: RESOLVED | Noted: 2023-02-13 | Resolved: 2024-05-01

## 2024-05-01 PROBLEM — M79.604 PAIN IN BOTH LOWER EXTREMITIES: Status: RESOLVED | Noted: 2023-02-13 | Resolved: 2024-05-01

## 2024-05-01 PROBLEM — E66.9 MILD OBESITY: Status: RESOLVED | Noted: 2023-02-13 | Resolved: 2024-05-01

## 2024-05-01 PROBLEM — E87.6 HYPOKALEMIA: Status: RESOLVED | Noted: 2023-02-13 | Resolved: 2024-05-01

## 2024-05-01 PROBLEM — M79.605 PAIN IN BOTH LOWER EXTREMITIES: Status: RESOLVED | Noted: 2023-02-13 | Resolved: 2024-05-01

## 2024-05-01 PROBLEM — E66.811 CLASS 1 OBESITY WITH BODY MASS INDEX (BMI) OF 30.0 TO 30.9 IN ADULT: Status: RESOLVED | Noted: 2023-02-13 | Resolved: 2024-05-01

## 2024-05-01 ASSESSMENT — ENCOUNTER SYMPTOMS
FEVER: 1
HEADACHES: 1
DIARRHEA: 1

## 2024-05-01 NOTE — ASSESSMENT & PLAN NOTE
- start on mucinex 600mg BID, flonase both nostrils BID x7d  - start on PRN tessalon perles TID PRN  - start on amoxicillin 500mg TID x10d

## 2024-05-02 ENCOUNTER — APPOINTMENT (OUTPATIENT)
Dept: PRIMARY CARE | Facility: CLINIC | Age: 79
End: 2024-05-02
Payer: MEDICARE

## 2024-05-03 ENCOUNTER — APPOINTMENT (OUTPATIENT)
Dept: PRIMARY CARE | Facility: CLINIC | Age: 79
End: 2024-05-03
Payer: MEDICARE

## 2024-05-10 ENCOUNTER — TELEPHONE (OUTPATIENT)
Dept: PRIMARY CARE | Facility: CLINIC | Age: 79
End: 2024-05-10
Payer: MEDICARE

## 2024-05-10 DIAGNOSIS — J06.9 UPPER RESPIRATORY TRACT INFECTION, UNSPECIFIED TYPE: Primary | ICD-10-CM

## 2024-05-10 RX ORDER — LEVOFLOXACIN 500 MG/1
500 TABLET, FILM COATED ORAL DAILY
Qty: 5 TABLET | Refills: 0 | Status: SHIPPED | OUTPATIENT
Start: 2024-05-10 | End: 2024-05-15

## 2024-05-10 RX ORDER — PREDNISONE 20 MG/1
40 TABLET ORAL DAILY
Qty: 10 TABLET | Refills: 0 | Status: SHIPPED | OUTPATIENT
Start: 2024-05-10 | End: 2024-05-15

## 2024-07-12 DIAGNOSIS — I10 ESSENTIAL (PRIMARY) HYPERTENSION: ICD-10-CM

## 2024-07-12 RX ORDER — OLMESARTAN MEDOXOMIL AND HYDROCHLOROTHIAZIDE 20/12.5 20; 12.5 MG/1; MG/1
TABLET ORAL
Qty: 90 TABLET | Refills: 0 | Status: SHIPPED | OUTPATIENT
Start: 2024-07-12

## 2024-08-21 DIAGNOSIS — K21.9 GASTROESOPHAGEAL REFLUX DISEASE WITHOUT ESOPHAGITIS: ICD-10-CM

## 2024-08-21 RX ORDER — OMEPRAZOLE 20 MG/1
20 CAPSULE, DELAYED RELEASE ORAL DAILY
Qty: 90 CAPSULE | Refills: 1 | Status: SHIPPED | OUTPATIENT
Start: 2024-08-21 | End: 2025-02-17

## 2024-08-28 ENCOUNTER — APPOINTMENT (OUTPATIENT)
Dept: PRIMARY CARE | Facility: CLINIC | Age: 79
End: 2024-08-28
Payer: MEDICARE

## 2024-08-28 VITALS
SYSTOLIC BLOOD PRESSURE: 102 MMHG | OXYGEN SATURATION: 97 % | WEIGHT: 159 LBS | DIASTOLIC BLOOD PRESSURE: 66 MMHG | BODY MASS INDEX: 29.26 KG/M2 | RESPIRATION RATE: 18 BRPM | HEIGHT: 62 IN | HEART RATE: 74 BPM

## 2024-08-28 DIAGNOSIS — E78.01 FAMILIAL HYPERCHOLESTEREMIA: ICD-10-CM

## 2024-08-28 DIAGNOSIS — S60.862A INSECT BITE OF LEFT WRIST, INITIAL ENCOUNTER: ICD-10-CM

## 2024-08-28 DIAGNOSIS — Z00.00 MEDICARE ANNUAL WELLNESS VISIT, SUBSEQUENT: ICD-10-CM

## 2024-08-28 DIAGNOSIS — I10 BENIGN HYPERTENSION: ICD-10-CM

## 2024-08-28 DIAGNOSIS — Z78.0 POSTMENOPAUSAL: ICD-10-CM
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
## 2024-08-28 DIAGNOSIS — E55.9 VITAMIN D DEFICIENCY: ICD-10-CM

## 2024-08-28 DIAGNOSIS — K21.9 GERD WITHOUT ESOPHAGITIS: ICD-10-CM

## 2024-08-28 DIAGNOSIS — Z00.00 ROUTINE GENERAL MEDICAL EXAMINATION AT HEALTH CARE FACILITY: Primary | ICD-10-CM

## 2024-08-28 PROCEDURE — 1123F ACP DISCUSS/DSCN MKR DOCD: CPT | Performed by: INTERNAL MEDICINE

## 2024-08-28 PROCEDURE — G0439 PPPS, SUBSEQ VISIT: HCPCS | Performed by: INTERNAL MEDICINE

## 2024-08-28 PROCEDURE — 1126F AMNT PAIN NOTED NONE PRSNT: CPT | Performed by: INTERNAL MEDICINE

## 2024-08-28 PROCEDURE — 3074F SYST BP LT 130 MM HG: CPT | Performed by: INTERNAL MEDICINE

## 2024-08-28 PROCEDURE — 3078F DIAST BP <80 MM HG: CPT | Performed by: INTERNAL MEDICINE

## 2024-08-28 PROCEDURE — 99214 OFFICE O/P EST MOD 30 MIN: CPT | Performed by: INTERNAL MEDICINE

## 2024-08-28 PROCEDURE — 1036F TOBACCO NON-USER: CPT | Performed by: INTERNAL MEDICINE

## 2024-08-28 PROCEDURE — 1159F MED LIST DOCD IN RCRD: CPT | Performed by: INTERNAL MEDICINE

## 2024-08-28 PROCEDURE — 1170F FXNL STATUS ASSESSED: CPT | Performed by: INTERNAL MEDICINE

## 2024-08-28 PROCEDURE — 1160F RVW MEDS BY RX/DR IN RCRD: CPT | Performed by: INTERNAL MEDICINE

## 2024-08-28 RX ORDER — TRIAMCINOLONE ACETONIDE 1 MG/G
CREAM TOPICAL 2 TIMES DAILY
Qty: 15 G | Refills: 1 | Status: SHIPPED | OUTPATIENT
Start: 2024-08-28

## 2024-08-28 ASSESSMENT — ACTIVITIES OF DAILY LIVING (ADL)
MANAGING_FINANCES: INDEPENDENT
GROCERY_SHOPPING: INDEPENDENT
DRESSING: INDEPENDENT
BATHING: INDEPENDENT
DOING_HOUSEWORK: INDEPENDENT
TAKING_MEDICATION: INDEPENDENT

## 2024-08-28 ASSESSMENT — PAIN SCALES - GENERAL: PAINLEVEL: 0-NO PAIN

## 2024-08-28 NOTE — PATIENT INSTRUCTIONS
It was nice to see you today for your annual Medicare Wellness visit.  As discussed during our visit today ...  Please continue to take your current medications   Please get bloodwork drawn as soon as you are able. We will call you with results.  We ordered a dexa scan to check for osteoporosis. Please get this completed as soon as you are able. We will call you with results.  We have sent in prescription cream for your rash on left arm. Call if any worsening  Follow up in four months

## 2024-08-28 NOTE — PROGRESS NOTES
Patient ID: She states that she is very active. She states that she walks twice per day and she is caregiver for her . She states that her brother in law recently moved in with her and her , he is paraplegic and she has to prepare his meals. She denies any cough, CP or SOB.     Philomena Conway is a 79 y.o. female with PMH remarkable for HTN, GERD,  HLD, osteopenia, low back pain who presents to the office today for Medicare Annual Wellness Visit Subsequent.    HEALTH MAINTENANCE: Annual Medicare Wellness Physical  Mammogram (40-75): 2019  Pap smear (21-65, or hysterectomy q5yrs): na age  Last Labs: 3/24/23  Colonoscopy (45-75 or age 40 with 1st degree relative dx colon ca):  Lung cancer screening (50-76 y/o x 20 pk yr for at least 20 yrs + current smoker OR quit in last 15 years, no CT w/I last year):   DEXA (65+, q 2 years):     Social History     Tobacco Use    Smoking status: Never    Smokeless tobacco: Never   Substance Use Topics    Alcohol use: Never    Drug use: Never     Immunization History   Administered Date(s) Administered    Flu vaccine, quadrivalent, high-dose, preservative free, age 65y+ (FLUZONE) 11/10/2022, 09/07/2023    Influenza, injectable, quadrivalent 10/19/2018, 10/23/2019, 09/25/2020    Influenza, seasonal, injectable 11/10/2022    Pfizer Purple Cap SARS-CoV-2 03/01/2021, 03/22/2021, 10/03/2021    Pneumococcal polysaccharide vaccine, 23-valent, age 2 years and older (PNEUMOVAX 23) 01/01/2012, 03/04/2013    Tdap vaccine, age 7 year and older (BOOSTRIX, ADACEL) 01/01/2012    Zoster, live 01/01/2013     Review of Systems   Constitutional: Negative.    HENT: Negative.     Respiratory: Negative.     Cardiovascular: Negative.    Gastrointestinal: Negative.    Genitourinary: Negative.    Musculoskeletal: Negative.    Skin: Negative.    Neurological: Negative.    Psychiatric/Behavioral: Negative.       No Known Allergies    Visit Vitals  /66 (BP Location: Left arm, Patient Position:  "Sitting)   Pulse 74   Resp 18   Ht 1.575 m (5' 2\")   Wt 72.1 kg (159 lb)   SpO2 97%   BMI 29.08 kg/m²   Smoking Status Never   BSA 1.78 m²     Physical Exam  Vitals reviewed.   Constitutional:       Appearance: Normal appearance.   HENT:      Head: Normocephalic and atraumatic.   Cardiovascular:      Rate and Rhythm: Normal rate and regular rhythm.      Pulses: Normal pulses.      Heart sounds: Normal heart sounds.   Pulmonary:      Effort: Pulmonary effort is normal.      Breath sounds: Normal breath sounds.   Abdominal:      General: Bowel sounds are normal.      Palpations: Abdomen is soft.   Musculoskeletal:         General: Normal range of motion.   Skin:     General: Skin is warm and dry.   Neurological:      General: No focal deficit present.      Mental Status: She is alert and oriented to person, place, and time.   Psychiatric:         Mood and Affect: Mood normal.         Behavior: Behavior normal.       Current Outpatient Medications   Medication Instructions    fluticasone (Flonase) 50 mcg/actuation nasal spray 1 spray, Each Nostril, Daily, Shake gently. Before first use, prime pump. After use, clean tip and replace cap.    olmesartan-hydrochlorothiazide (BENIcar HCT) 20-12.5 mg tablet TAKE 1 TABLET BY MOUTH EVERY DAY    omeprazole (PRILOSEC) 20 mg, oral, Daily, Do not crush or chew.    triamcinolone (Kenalog) 0.1 % cream Topical, 2 times daily, Apply to affected area 1-2 times daily as needed.    vitamin D3-vitamin K2 1,250-200 mcg capsule oral     Lab Results   Component Value Date    WBC 7.2 03/24/2023    HGB 12.9 03/24/2023    HCT 38.9 03/24/2023     03/24/2023    CHOL 202 (H) 03/24/2023    TRIG 66 03/24/2023    HDL 56.9 03/24/2023    ALT 15 03/24/2023    AST 24 03/24/2023     03/24/2023    K 3.9 03/24/2023     03/24/2023    CREATININE 0.79 03/24/2023    BUN 15 03/24/2023    CO2 28 03/24/2023    TSH 0.88 03/24/2023     Problem List Items Addressed This Visit             ICD-10-CM "    Benign hypertension I10    Relevant Orders    Lipid panel    Comprehensive metabolic panel    Tsh With Reflex To Free T4 If Abnormal    Familial hypercholesteremia E78.01    GERD without esophagitis K21.9    Relevant Orders    CBC and Auto Differential    Vitamin D deficiency E55.9    Relevant Orders    Vitamin D 25-Hydroxy,Total (for eval of Vitamin D levels)     Other Visit Diagnoses         Codes    Postmenopausal     Z78.0    Relevant Orders    XR DEXA bone density    Insect bite of left wrist, initial encounter     S60.862A, W57.XXXA    Relevant Medications    triamcinolone (Kenalog) 0.1 % cream          --------------------  Written by Ro Pritchett LPN, acting as a scribe for Dr. Nguyen. This note accurately reflects the work and decisions made by Dr. Nguyen.     I, Dr. Nguyen, attest all medical record entries made by the scribe were under my direction and were personally dictated by me. I have reviewed the chart and agree that the record accurately reflects my performance of the history, physical exam, and assessment and plan.

## 2024-08-29 ASSESSMENT — ENCOUNTER SYMPTOMS
PSYCHIATRIC NEGATIVE: 1
MUSCULOSKELETAL NEGATIVE: 1
CARDIOVASCULAR NEGATIVE: 1
RESPIRATORY NEGATIVE: 1
CONSTITUTIONAL NEGATIVE: 1
GASTROINTESTINAL NEGATIVE: 1
NEUROLOGICAL NEGATIVE: 1

## 2024-09-03 ENCOUNTER — APPOINTMENT (OUTPATIENT)
Dept: RADIOLOGY | Facility: HOSPITAL | Age: 79
End: 2024-09-03
Payer: MEDICARE

## 2024-09-03 ENCOUNTER — LAB (OUTPATIENT)
Dept: LAB | Facility: LAB | Age: 79
End: 2024-09-03
Payer: MEDICARE

## 2024-09-03 ENCOUNTER — HOSPITAL ENCOUNTER (OUTPATIENT)
Dept: RADIOLOGY | Facility: HOSPITAL | Age: 79
Discharge: HOME | End: 2024-09-03
Payer: MEDICARE

## 2024-09-03 DIAGNOSIS — K21.9 GERD WITHOUT ESOPHAGITIS: ICD-10-CM

## 2024-09-03 DIAGNOSIS — E55.9 VITAMIN D DEFICIENCY: ICD-10-CM

## 2024-09-03 DIAGNOSIS — Z78.0 POSTMENOPAUSAL: ICD-10-CM

## 2024-09-03 DIAGNOSIS — I10 BENIGN HYPERTENSION: ICD-10-CM

## 2024-09-03 LAB
25(OH)D3 SERPL-MCNC: 89 NG/ML (ref 30–100)
ALBUMIN SERPL BCP-MCNC: 3.8 G/DL (ref 3.4–5)
ALP SERPL-CCNC: 46 U/L (ref 33–136)
ALT SERPL W P-5'-P-CCNC: 15 U/L (ref 7–45)
ANION GAP SERPL CALC-SCNC: 11 MMOL/L (ref 10–20)
AST SERPL W P-5'-P-CCNC: 22 U/L (ref 9–39)
BASOPHILS # BLD AUTO: 0.06 X10*3/UL (ref 0–0.1)
BASOPHILS NFR BLD AUTO: 1 %
BILIRUB SERPL-MCNC: 0.7 MG/DL (ref 0–1.2)
BUN SERPL-MCNC: 12 MG/DL (ref 6–23)
CALCIUM SERPL-MCNC: 9.7 MG/DL (ref 8.6–10.3)
CHLORIDE SERPL-SCNC: 101 MMOL/L (ref 98–107)
CHOLEST SERPL-MCNC: 195 MG/DL (ref 0–199)
CHOLESTEROL/HDL RATIO: 3.5
CO2 SERPL-SCNC: 29 MMOL/L (ref 21–32)
CREAT SERPL-MCNC: 0.69 MG/DL (ref 0.5–1.05)
EGFRCR SERPLBLD CKD-EPI 2021: 90 ML/MIN/1.73M*2
EOSINOPHIL # BLD AUTO: 0.1 X10*3/UL (ref 0–0.4)
EOSINOPHIL NFR BLD AUTO: 1.7 %
ERYTHROCYTE [DISTWIDTH] IN BLOOD BY AUTOMATED COUNT: 12.3 % (ref 11.5–14.5)
GLUCOSE SERPL-MCNC: 85 MG/DL (ref 74–99)
HCT VFR BLD AUTO: 36.1 % (ref 36–46)
HDLC SERPL-MCNC: 56.4 MG/DL
HGB BLD-MCNC: 12.3 G/DL (ref 12–16)
IMM GRANULOCYTES # BLD AUTO: 0.01 X10*3/UL (ref 0–0.5)
IMM GRANULOCYTES NFR BLD AUTO: 0.2 % (ref 0–0.9)
LDLC SERPL CALC-MCNC: 126 MG/DL
LYMPHOCYTES # BLD AUTO: 1.79 X10*3/UL (ref 0.8–3)
LYMPHOCYTES NFR BLD AUTO: 31 %
MCH RBC QN AUTO: 30.7 PG (ref 26–34)
MCHC RBC AUTO-ENTMCNC: 34.1 G/DL (ref 32–36)
MCV RBC AUTO: 90 FL (ref 80–100)
MONOCYTES # BLD AUTO: 0.42 X10*3/UL (ref 0.05–0.8)
MONOCYTES NFR BLD AUTO: 7.3 %
NEUTROPHILS # BLD AUTO: 3.4 X10*3/UL (ref 1.6–5.5)
NEUTROPHILS NFR BLD AUTO: 58.8 %
NON HDL CHOLESTEROL: 139 MG/DL (ref 0–149)
NRBC BLD-RTO: 0 /100 WBCS (ref 0–0)
PLATELET # BLD AUTO: 230 X10*3/UL (ref 150–450)
POTASSIUM SERPL-SCNC: 3.8 MMOL/L (ref 3.5–5.3)
PROT SERPL-MCNC: 6.5 G/DL (ref 6.4–8.2)
RBC # BLD AUTO: 4.01 X10*6/UL (ref 4–5.2)
SODIUM SERPL-SCNC: 137 MMOL/L (ref 136–145)
TRIGL SERPL-MCNC: 62 MG/DL (ref 0–149)
TSH SERPL-ACNC: 1.34 MIU/L (ref 0.44–3.98)
VLDL: 12 MG/DL (ref 0–40)
WBC # BLD AUTO: 5.8 X10*3/UL (ref 4.4–11.3)

## 2024-09-03 PROCEDURE — 82306 VITAMIN D 25 HYDROXY: CPT

## 2024-09-03 PROCEDURE — 80061 LIPID PANEL: CPT

## 2024-09-03 PROCEDURE — 84443 ASSAY THYROID STIM HORMONE: CPT

## 2024-09-03 PROCEDURE — 85025 COMPLETE CBC W/AUTO DIFF WBC: CPT

## 2024-09-03 PROCEDURE — 36415 COLL VENOUS BLD VENIPUNCTURE: CPT

## 2024-09-03 PROCEDURE — 77080 DXA BONE DENSITY AXIAL: CPT

## 2024-09-03 PROCEDURE — 80053 COMPREHEN METABOLIC PANEL: CPT

## 2024-09-03 ASSESSMENT — LIFESTYLE VARIABLES
3_OR_MORE_DRINKS_PER_DAY: N
CURRENT_SMOKER: N

## 2024-09-26 ENCOUNTER — APPOINTMENT (OUTPATIENT)
Dept: PRIMARY CARE | Facility: CLINIC | Age: 79
End: 2024-09-26
Payer: MEDICARE

## 2024-09-26 VITALS
WEIGHT: 156 LBS | DIASTOLIC BLOOD PRESSURE: 70 MMHG | OXYGEN SATURATION: 97 % | RESPIRATION RATE: 18 BRPM | HEART RATE: 74 BPM | BODY MASS INDEX: 28.71 KG/M2 | SYSTOLIC BLOOD PRESSURE: 112 MMHG | HEIGHT: 62 IN

## 2024-09-26 DIAGNOSIS — M81.0 OSTEOPOROSIS WITHOUT CURRENT PATHOLOGICAL FRACTURE, UNSPECIFIED OSTEOPOROSIS TYPE: ICD-10-CM

## 2024-09-26 PROCEDURE — 1160F RVW MEDS BY RX/DR IN RCRD: CPT | Performed by: INTERNAL MEDICINE

## 2024-09-26 PROCEDURE — 3078F DIAST BP <80 MM HG: CPT | Performed by: INTERNAL MEDICINE

## 2024-09-26 PROCEDURE — 1126F AMNT PAIN NOTED NONE PRSNT: CPT | Performed by: INTERNAL MEDICINE

## 2024-09-26 PROCEDURE — 3074F SYST BP LT 130 MM HG: CPT | Performed by: INTERNAL MEDICINE

## 2024-09-26 PROCEDURE — 1123F ACP DISCUSS/DSCN MKR DOCD: CPT | Performed by: INTERNAL MEDICINE

## 2024-09-26 PROCEDURE — 99213 OFFICE O/P EST LOW 20 MIN: CPT | Performed by: INTERNAL MEDICINE

## 2024-09-26 PROCEDURE — 1036F TOBACCO NON-USER: CPT | Performed by: INTERNAL MEDICINE

## 2024-09-26 PROCEDURE — 1159F MED LIST DOCD IN RCRD: CPT | Performed by: INTERNAL MEDICINE

## 2024-09-26 RX ORDER — ALENDRONATE SODIUM 70 MG/1
70 TABLET ORAL
Qty: 4 TABLET | Refills: 2 | Status: SHIPPED | OUTPATIENT
Start: 2024-09-26 | End: 2024-12-19

## 2024-09-26 ASSESSMENT — PAIN SCALES - GENERAL: PAINLEVEL: 0-NO PAIN

## 2024-09-26 ASSESSMENT — ENCOUNTER SYMPTOMS
RESPIRATORY NEGATIVE: 1
GASTROINTESTINAL NEGATIVE: 1
CONSTITUTIONAL NEGATIVE: 1
CARDIOVASCULAR NEGATIVE: 1
MUSCULOSKELETAL NEGATIVE: 1
NEUROLOGICAL NEGATIVE: 1
PSYCHIATRIC NEGATIVE: 1

## 2024-09-26 ASSESSMENT — PATIENT HEALTH QUESTIONNAIRE - PHQ9
SUM OF ALL RESPONSES TO PHQ9 QUESTIONS 1 AND 2: 0
2. FEELING DOWN, DEPRESSED OR HOPELESS: NOT AT ALL
1. LITTLE INTEREST OR PLEASURE IN DOING THINGS: NOT AT ALL

## 2024-09-26 NOTE — ASSESSMENT & PLAN NOTE
Discussed dexa scan results with patient from 9/3/24  She is agreeable to start oral medication for osteoporosis  Discussed possible side effects of medication with patient, advised to stop taking medication if she develops any jaw pain  Repeat dexa scan every two years

## 2024-09-26 NOTE — PATIENT INSTRUCTIONS
It was great to see you in the office today! Here is what we discussed at your visit today:  Please continue to take your current medications   We will send in prescription for Alendronate which is a once weekly medication  Call if you have any side effects on this medication  Stop this medication if you develop any jaw pain

## 2024-09-26 NOTE — PROGRESS NOTES
"Patient ID: She states that she is very busy, takes care of her  and brother in law at home. She states she walks a lot. She has never been on medications for osteoporosis. She states she is taking vitamin D supplement.     Last Office Visit: 8/28/24    HPI Philomena Conway is a 79 y.o. female with PMH remarkable for HTN, GERD, HLD, osteopenia, low back pain who presents to the office today for follow up.     Social History     Tobacco Use    Smoking status: Never    Smokeless tobacco: Never   Substance Use Topics    Alcohol use: Never      Review of Systems   Constitutional: Negative.    HENT: Negative.     Respiratory: Negative.     Cardiovascular: Negative.    Gastrointestinal: Negative.    Genitourinary: Negative.    Musculoskeletal: Negative.    Skin: Negative.    Neurological: Negative.    Psychiatric/Behavioral: Negative.         Visit Vitals  Vitals:    09/26/24 1105   BP: 112/70   BP Location: Left arm   Patient Position: Sitting   Pulse: 74   Resp: 18   SpO2: 97%   Weight: 70.8 kg (156 lb)   Height: 1.575 m (5' 2\")      Smoking Status Never     No Known Allergies     Physical Exam  Vitals reviewed.   Constitutional:       Appearance: Normal appearance.   HENT:      Head: Normocephalic and atraumatic.   Cardiovascular:      Rate and Rhythm: Normal rate and regular rhythm.      Pulses: Normal pulses.      Heart sounds: Normal heart sounds.   Pulmonary:      Effort: Pulmonary effort is normal.      Breath sounds: Normal breath sounds.   Abdominal:      General: Bowel sounds are normal.      Palpations: Abdomen is soft.   Musculoskeletal:         General: Normal range of motion.   Skin:     General: Skin is warm and dry.   Neurological:      General: No focal deficit present.      Mental Status: She is alert and oriented to person, place, and time.   Psychiatric:         Mood and Affect: Mood normal.         Behavior: Behavior normal.       Current Outpatient Medications   Medication Instructions    " olmesartan-hydrochlorothiazide (BENIcar HCT) 20-12.5 mg tablet TAKE 1 TABLET BY MOUTH EVERY DAY    triamcinolone (Kenalog) 0.1 % cream Topical, 2 times daily, Apply to affected area 1-2 times daily as needed.    vitamin D3-vitamin K2 1,250-200 mcg capsule oral      Lab Results   Component Value Date    WBC 5.8 09/03/2024    HGB 12.3 09/03/2024    HCT 36.1 09/03/2024     09/03/2024    CHOL 195 09/03/2024    TRIG 62 09/03/2024    HDL 56.4 09/03/2024    ALT 15 09/03/2024    AST 22 09/03/2024     09/03/2024    K 3.8 09/03/2024     09/03/2024    CREATININE 0.69 09/03/2024    BUN 12 09/03/2024    CO2 29 09/03/2024    TSH 1.34 09/03/2024       Assessment/Plan   Problem List Items Addressed This Visit             ICD-10-CM    Osteoporosis without current pathological fracture M81.0     Discussed dexa scan results with patient from 9/3/24  She is agreeable to start oral medication for osteoporosis  Discussed possible side effects of medication with patient, advised to stop taking medication if she develops any jaw pain  Repeat dexa scan every two years         Relevant Medications    alendronate (Fosamax) 70 mg tablet     ------  Written by Yvonne Pritchett LPN, acting as a scribe for Dr. Nguyen. This note accurately reflects the work and decisions made by Dr. Nguyen.     I, Dr. Nguyen, attest all medical record entries made by the scribe were under my direction and were personally dictated by me. I have reviewed the chart and agree that the record accurately reflects my performance of the history, physical exam, and assessment and plan.

## 2024-10-01 ENCOUNTER — TELEPHONE (OUTPATIENT)
Dept: PRIMARY CARE | Facility: CLINIC | Age: 79
End: 2024-10-01
Payer: MEDICARE

## 2024-10-04 DIAGNOSIS — I10 ESSENTIAL (PRIMARY) HYPERTENSION: ICD-10-CM

## 2024-10-04 RX ORDER — OLMESARTAN MEDOXOMIL AND HYDROCHLOROTHIAZIDE 20/12.5 20; 12.5 MG/1; MG/1
TABLET ORAL
Qty: 90 TABLET | Refills: 0 | Status: SHIPPED | OUTPATIENT
Start: 2024-10-04

## 2024-10-16 DIAGNOSIS — M81.0 OSTEOPOROSIS WITHOUT CURRENT PATHOLOGICAL FRACTURE, UNSPECIFIED OSTEOPOROSIS TYPE: ICD-10-CM

## 2024-10-16 RX ORDER — ALENDRONATE SODIUM 70 MG/1
70 TABLET ORAL
Qty: 12 TABLET | Refills: 0 | Status: SHIPPED | OUTPATIENT
Start: 2024-10-16 | End: 2025-01-08

## 2024-12-29 DIAGNOSIS — I10 ESSENTIAL (PRIMARY) HYPERTENSION: ICD-10-CM

## 2024-12-30 RX ORDER — OLMESARTAN MEDOXOMIL AND HYDROCHLOROTHIAZIDE 20/12.5 20; 12.5 MG/1; MG/1
TABLET ORAL
Qty: 90 TABLET | Refills: 0 | Status: SHIPPED | OUTPATIENT
Start: 2024-12-30

## 2025-03-27 DIAGNOSIS — I10 ESSENTIAL (PRIMARY) HYPERTENSION: ICD-10-CM

## 2025-03-28 RX ORDER — OLMESARTAN MEDOXOMIL AND HYDROCHLOROTHIAZIDE 20/12.5 20; 12.5 MG/1; MG/1
TABLET ORAL
Qty: 90 TABLET | Refills: 0 | Status: SHIPPED | OUTPATIENT
Start: 2025-03-28

## 2025-05-07 NOTE — PROGRESS NOTES
Patient ID:   Philomena Conway is a 79 y.o. female with PMH remarkable for HTN, GERD, HLD, osteopenia, low back pain  who presents to the office today for rash.     Last Office Visit: 9/26/24 with Dr. Patrick STERN    Social History     Tobacco Use    Smoking status: Never    Smokeless tobacco: Never   Substance Use Topics    Alcohol use: Never      Review of Systems    Visit Vitals  Smoking Status Never     Allergies[1]     Physical Exam    Current Outpatient Medications   Medication Instructions    alendronate (FOSAMAX) 70 mg, oral, Every 7 days, Take in the morning with a full glass of water, on an empty stomach, and do not take anything else by mouth or lie down for the next 30 min.    olmesartan-hydrochlorothiazide (BENIcar HCT) 20-12.5 mg tablet TAKE 1 TABLET BY MOUTH EVERY DAY    triamcinolone (Kenalog) 0.1 % cream Topical, 2 times daily, Apply to affected area 1-2 times daily as needed.    vitamin D3-vitamin K2 1,250-200 mcg capsule oral      Lab Results   Component Value Date    WBC 5.8 09/03/2024    HGB 12.3 09/03/2024    HCT 36.1 09/03/2024     09/03/2024    CHOL 195 09/03/2024    TRIG 62 09/03/2024    HDL 56.4 09/03/2024    ALT 15 09/03/2024    AST 22 09/03/2024     09/03/2024    K 3.8 09/03/2024     09/03/2024    CREATININE 0.69 09/03/2024    BUN 12 09/03/2024    CO2 29 09/03/2024    TSH 1.34 09/03/2024       Assessment/Plan     ------         [1] No Known Allergies

## 2025-05-08 PROBLEM — R21 RASH: Status: ACTIVE | Noted: 2025-05-08

## 2025-05-08 PROBLEM — Z12.31 ENCOUNTER FOR SCREENING MAMMOGRAM FOR BREAST CANCER: Status: ACTIVE | Noted: 2025-05-08

## 2025-05-08 PROBLEM — Z12.11 ENCOUNTER FOR SCREENING COLONOSCOPY: Status: ACTIVE | Noted: 2025-05-08

## 2025-05-08 NOTE — ASSESSMENT & PLAN NOTE
- DEXA September 2024: Left femoral neck osteoporosis. Left total hip and lumbar spine osteopenia.   - Was started on Fosamax but did not tolerate  - Check vitamin D level, calcium level   - Due for repeat DEXA scan in September 2025; ordered today

## 2025-05-08 NOTE — PROGRESS NOTES
"Patient ID:   Philomena Conway is a 79 y.o. female with PMH remarkable for HTN, GERD, HLD, osteopenia, left femoral neck osteoporosis, low back pain who presents to the office today for rash and urinary symptoms     Last Office Visit: 9/26/24 with Dr. Nguyen       HPI:    Patient has been using triamcinolone cream for the last few weeks for a rash on her bilateral shoulders. She reports that the right shoulder has cleared up but she still has some dryness and itching on the left shoulder. No erythema on exam.     She also complains of urinary frequency and having to rush to the bathroom when she gets the urge to go. She has trouble sleeping through the night due to urinary frequency. She denies fevers, chills, dysuria, flank pain, and hematuria.     Social History     Tobacco Use    Smoking status: Never    Smokeless tobacco: Never   Substance Use Topics    Alcohol use: Never      Review of Systems   Constitutional:  Negative for activity change, appetite change, chills and fever.   HENT:  Negative for congestion.    Respiratory:  Negative for cough and shortness of breath.    Cardiovascular:  Negative for chest pain and leg swelling.   Gastrointestinal:  Negative for abdominal pain, constipation, diarrhea, nausea and vomiting.   Genitourinary:  Positive for frequency and urgency. Negative for difficulty urinating, dysuria, flank pain and hematuria.   Musculoskeletal:  Negative for joint swelling.   Skin:  Positive for rash (left shoulder, improving).   Neurological:  Negative for dizziness and light-headedness.   All other systems reviewed and are negative.    Visit Vitals  /69 (BP Location: Left arm, Patient Position: Sitting)   Pulse 74   Resp 18   Ht 1.575 m (5' 2\")   Wt 71.7 kg (158 lb)   SpO2 97%   BMI 28.90 kg/m²   Smoking Status Never   BSA 1.77 m²     Allergies[1]   Physical Exam  Vitals and nursing note reviewed.   Constitutional:       General: She is not in acute distress.     Appearance: She is not " toxic-appearing.   HENT:      Head: Normocephalic and atraumatic.      Mouth/Throat:      Mouth: Mucous membranes are moist.   Eyes:      General: No scleral icterus.     Conjunctiva/sclera: Conjunctivae normal.   Cardiovascular:      Rate and Rhythm: Normal rate and regular rhythm.   Pulmonary:      Effort: Pulmonary effort is normal. No respiratory distress.      Breath sounds: Normal breath sounds.   Abdominal:      General: There is no distension.      Palpations: Abdomen is soft.      Tenderness: There is no abdominal tenderness.   Musculoskeletal:         General: No swelling.      Cervical back: Normal range of motion.   Skin:     General: Skin is warm and dry.      Comments: Dry scaly skin of left shoulder, no erythema or open areas    Neurological:      Mental Status: She is alert and oriented to person, place, and time.   Psychiatric:         Mood and Affect: Mood normal.         Behavior: Behavior normal.       Current Outpatient Medications   Medication Instructions    alendronate (FOSAMAX) 70 mg, oral, Every 7 days, Take in the morning with a full glass of water, on an empty stomach, and do not take anything else by mouth or lie down for the next 30 min.    ascorbic acid (VITAMIN C) 1,000 mg, Daily    biotin 10,000 mcg capsule 1 capsule, Daily    cholecalciferol (VITAMIN D3) 50 mcg, Daily    cranberry conc/C/Bacill coag (CRANBERRY URINARY TRACT HEALTH ORAL) Daily PRN    multivitamin tablet 1 tablet, Daily    olmesartan-hydrochlorothiazide (BENIcar HCT) 20-12.5 mg tablet TAKE 1 TABLET BY MOUTH EVERY DAY    oxyBUTYnin (DITROPAN) 5 mg, oral, 2 times daily    triamcinolone (Kenalog) 0.1 % cream Topical, 2 times daily, Apply to affected area 1-2 times daily as needed.      Lab Results   Component Value Date    WBC 5.8 09/03/2024    HGB 12.3 09/03/2024    HCT 36.1 09/03/2024     09/03/2024    CHOL 195 09/03/2024    TRIG 62 09/03/2024    HDL 56.4 09/03/2024    ALT 15 09/03/2024    AST 22 09/03/2024      09/03/2024    K 3.8 09/03/2024     09/03/2024    CREATININE 0.69 09/03/2024    BUN 12 09/03/2024    CO2 29 09/03/2024    TSH 1.34 09/03/2024       Assessment/Plan   Problem List Items Addressed This Visit           ICD-10-CM       Cardiac and Vasculature    Benign hypertension I10    - BP today 109/69  - Continue olmesartan-hydrochlorothiazide   - Regular exercise and blood pressure monitoring is encouraged              Endocrine/Metabolic    Vitamin D deficiency E55.9    Relevant Orders    Vitamin D 25-Hydroxy,Total (for eval of Vitamin D levels)    Osteoporosis without current pathological fracture M81.0    - DEXA September 2024: Left femoral neck osteoporosis. Left total hip and lumbar spine osteopenia.   - Was started on Fosamax but did not tolerate  - Check vitamin D level, calcium level   - Due for repeat DEXA scan in September 2025; ordered today          Relevant Orders    XR DEXA bone density       Gastrointestinal and Abdominal    Encounter for screening colonoscopy Z12.11    - Has never had a colonoscopy, refused at multiple visits  - no family history of CRC   - Patient agreeable to Cologuard; order placed                 Genitourinary and Reproductive    Encounter for screening mammogram for breast cancer Z12.31    - last mammogram in 2019 with no evidence of malignancy  - patient declines repeat mammogram at this time          Urinary frequency R35.0    - UA collected in office today was negative  - patient reports having to run to the bathroom when she feels the urge to urinate, has had incontinence due to urgency  - start oxybutynin 5 mg BID and monitor response  - patient educated on side effects including dry mouth, constipation          Relevant Medications    oxyBUTYnin (Ditropan) 5 mg tablet    Other Relevant Orders    POCT UA Automated manually resulted (Completed)       Health Encounters    Encounter for annual physical exam Z00.00    Relevant Orders    Vitamin D 25-Hydroxy,Total  (for eval of Vitamin D levels)    Lipid Panel    TSH with reflex to Free T4 if abnormal    Comprehensive Metabolic Panel    CBC and Auto Differential    Hemoglobin A1c       Skin    Rash - Primary R21    - using triamcinolone 0.1% cream for the last few weeks with improvement   - Continue triamcinolone 0.1% BID PRN to rash- refilled today  - patient educated on using emollient such as Gold Bond or Eucerin for dry skin             Relevant Medications    triamcinolone (Kenalog) 0.1 % cream     Other Visit Diagnoses         Codes      Screening for colorectal cancer     Z12.11, Z12.12    Relevant Orders    Cologuard® colon cancer screening          ------         [1] No Known Allergies

## 2025-05-08 NOTE — ASSESSMENT & PLAN NOTE
- BP today 109/69  - Continue olmesartan-hydrochlorothiazide   - Regular exercise and blood pressure monitoring is encouraged

## 2025-05-08 NOTE — PATIENT INSTRUCTIONS
It was nice to see you in the office today!  As discussed during our visit...    A medication for overactive bladder has been sent to your pharmacy. This medication can cause dry mouth and constipation.    Continue triamcinolone cream as prescribed for rash.    A Cologuard screening for colon cancer has been ordered for you.   You are due for a DEXA (bone density) scan in September. An order has been placed.    You need to have routine labs drawn. We will contact you with results.       Please have your 12 hour FASTING blood drawn in the next couple weeks.    You do NOT need an appointment for lab work and/or Xray's but now that [facility] was bought out by [employer], an appointment for lab work is RECOMMENDED / encouraged.    Do not get lab work done while you are on steroids (prednisone, medrol dose pack, dexamethasone) unless instructed differently by Dr Nguyen because this can cause some labs to be off.  Hold Biotin, B vitamins, B Complex for 2-3 days before any blood draw (this can cause false thyroid function tests)  During the 12 hour FASTING time, you may have BLACK coffee, BLACK tea and/or water at any time.    The two nearest Outpatient Lab's to THIS office is:  [facility] (this is IN the Urgent Care building by [employer])  [address]  [place] [address] [phone]    Link to Schedule an Appointment:  [email or web address]    Hours:   Monday through Friday 7:30am - 4:30pm  CLOSED Saturday and Sunday    Or you can go to ...  [facility]  [address]  [place] [address]  [phone]    Link to Schedule an Appointment:  [email or web address]    Hours:   Monday through Friday 7am - 12:30pm, 1pm - 4:30pm  CLOSED Saturday and Sunday     Click the blue link to take you to  the website to confirm hours/location Location Search for Labwork  [email or web address]    We will contact you with the results of your blood work (once they have ALL finalized & Dr Nguyen has reviewed them) and let you know of any necessary adjustments need to be done to your plan of care.    If you do not hear from us within 3-5 days business days of having your blood drawn, please call the [place] office at [phone].   -----------  [facility]  [facility] offers affordable laboratory tests.   Financial assistance is also available to those who meet financial eligibility requirements by submitting an [facility] Application or calling, [phone].   Click this link to Get a Price Estimate Today on what lab work may cost you.

## 2025-05-08 NOTE — ASSESSMENT & PLAN NOTE
- last mammogram in 2019 with no evidence of malignancy  - patient declines repeat mammogram at this time

## 2025-05-08 NOTE — ASSESSMENT & PLAN NOTE
- Has never had a colonoscopy, refused at multiple visits  - no family history of CRC   - Patient agreeable to Cologuard; order placed

## 2025-05-08 NOTE — ASSESSMENT & PLAN NOTE
Lab Results   Component Value Date    CHOL 195 09/03/2024    TRIG 62 09/03/2024    HDL 56.4 09/03/2024    CHHDL 3.5 09/03/2024    LDLF 132 (H) 03/24/2023    VLDL 12 09/03/2024    NHDL 139 09/03/2024      - Not currently on a statin, repeat lipid panel ordered today

## 2025-05-09 ENCOUNTER — OFFICE VISIT (OUTPATIENT)
Dept: PRIMARY CARE | Facility: CLINIC | Age: 80
End: 2025-05-09
Payer: MEDICARE

## 2025-05-09 VITALS
DIASTOLIC BLOOD PRESSURE: 69 MMHG | OXYGEN SATURATION: 97 % | HEART RATE: 74 BPM | BODY MASS INDEX: 29.08 KG/M2 | RESPIRATION RATE: 18 BRPM | SYSTOLIC BLOOD PRESSURE: 109 MMHG | HEIGHT: 62 IN | WEIGHT: 158 LBS
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
## 2025-05-09 DIAGNOSIS — I10 BENIGN HYPERTENSION: ICD-10-CM

## 2025-05-09 DIAGNOSIS — M81.0 OSTEOPOROSIS WITHOUT CURRENT PATHOLOGICAL FRACTURE, UNSPECIFIED OSTEOPOROSIS TYPE: ICD-10-CM

## 2025-05-09 DIAGNOSIS — Z12.11 SCREENING FOR COLORECTAL CANCER: ICD-10-CM

## 2025-05-09 DIAGNOSIS — Z12.11 ENCOUNTER FOR SCREENING COLONOSCOPY: ICD-10-CM

## 2025-05-09 DIAGNOSIS — Z00.00 ENCOUNTER FOR ANNUAL PHYSICAL EXAM: ICD-10-CM

## 2025-05-09 DIAGNOSIS — S60.862A INSECT BITE OF LEFT WRIST, INITIAL ENCOUNTER: ICD-10-CM

## 2025-05-09 DIAGNOSIS — Z12.31 ENCOUNTER FOR SCREENING MAMMOGRAM FOR BREAST CANCER: ICD-10-CM

## 2025-05-09 DIAGNOSIS — Z12.12 SCREENING FOR COLORECTAL CANCER: ICD-10-CM

## 2025-05-09 DIAGNOSIS — E55.9 VITAMIN D DEFICIENCY: ICD-10-CM

## 2025-05-09 DIAGNOSIS — R21 RASH: Primary | ICD-10-CM

## 2025-05-09 DIAGNOSIS — R35.0 URINARY FREQUENCY: ICD-10-CM

## 2025-05-09 LAB
POC APPEARANCE, URINE: CLEAR
POC BILIRUBIN, URINE: NEGATIVE
POC BLOOD, URINE: NEGATIVE
POC COLOR, URINE: YELLOW
POC GLUCOSE, URINE: NEGATIVE MG/DL
POC KETONES, URINE: NEGATIVE MG/DL
POC LEUKOCYTES, URINE: NEGATIVE
POC NITRITE,URINE: NEGATIVE
POC PH, URINE: 7 PH
POC PROTEIN, URINE: NEGATIVE MG/DL
POC SPECIFIC GRAVITY, URINE: 1.01
POC UROBILINOGEN, URINE: 0.2 EU/DL

## 2025-05-09 PROCEDURE — 3074F SYST BP LT 130 MM HG: CPT

## 2025-05-09 PROCEDURE — 1159F MED LIST DOCD IN RCRD: CPT

## 2025-05-09 PROCEDURE — 99214 OFFICE O/P EST MOD 30 MIN: CPT

## 2025-05-09 PROCEDURE — 1036F TOBACCO NON-USER: CPT

## 2025-05-09 PROCEDURE — 81003 URINALYSIS AUTO W/O SCOPE: CPT

## 2025-05-09 PROCEDURE — 3078F DIAST BP <80 MM HG: CPT

## 2025-05-09 PROCEDURE — 1123F ACP DISCUSS/DSCN MKR DOCD: CPT

## 2025-05-09 PROCEDURE — 1125F AMNT PAIN NOTED PAIN PRSNT: CPT

## 2025-05-09 PROCEDURE — 1160F RVW MEDS BY RX/DR IN RCRD: CPT

## 2025-05-09 RX ORDER — IBUPROFEN 100 MG/5ML
1000 SUSPENSION, ORAL (FINAL DOSE FORM) ORAL DAILY
COMMUNITY

## 2025-05-09 RX ORDER — BISMUTH SUBSALICYLATE 262 MG
1 TABLET,CHEWABLE ORAL DAILY
COMMUNITY

## 2025-05-09 RX ORDER — TRIAMCINOLONE ACETONIDE 1 MG/G
CREAM TOPICAL 2 TIMES DAILY
Qty: 80 G | Refills: 1 | Status: SHIPPED | OUTPATIENT
Start: 2025-05-09

## 2025-05-09 RX ORDER — TRIAMCINOLONE ACETONIDE 1 MG/G
CREAM TOPICAL 2 TIMES DAILY
Qty: 80 G | Refills: 1 | OUTPATIENT
Start: 2025-05-09

## 2025-05-09 RX ORDER — OXYBUTYNIN CHLORIDE 5 MG/1
5 TABLET ORAL 2 TIMES DAILY
Qty: 60 TABLET | Refills: 1 | Status: SHIPPED | OUTPATIENT
Start: 2025-05-09 | End: 2025-07-08

## 2025-05-09 RX ORDER — ACETAMINOPHEN AND PHENYLEPHRINE HCL 325; 5 MG/1; MG/1
1 TABLET ORAL DAILY
COMMUNITY

## 2025-05-09 RX ORDER — TRIAMCINOLONE ACETONIDE 1 MG/G
CREAM TOPICAL 2 TIMES DAILY
Qty: 80 G | Refills: 1 | Status: SHIPPED | OUTPATIENT
Start: 2025-05-09 | End: 2025-05-09 | Stop reason: SDUPTHER

## 2025-05-09 RX ORDER — ACETAMINOPHEN 500 MG
50 TABLET ORAL DAILY
COMMUNITY

## 2025-05-09 ASSESSMENT — ENCOUNTER SYMPTOMS
CONSTIPATION: 0
DIARRHEA: 0
DIFFICULTY URINATING: 0
CHILLS: 0
FREQUENCY: 1
NAUSEA: 0
FLANK PAIN: 0
DYSURIA: 0
APPETITE CHANGE: 0
LIGHT-HEADEDNESS: 0
SHORTNESS OF BREATH: 0
DIZZINESS: 0
ACTIVITY CHANGE: 0
VOMITING: 0
ABDOMINAL PAIN: 0
JOINT SWELLING: 0
FEVER: 0
HEMATURIA: 0
COUGH: 0

## 2025-05-09 ASSESSMENT — PATIENT HEALTH QUESTIONNAIRE - PHQ9
1. LITTLE INTEREST OR PLEASURE IN DOING THINGS: NOT AT ALL
SUM OF ALL RESPONSES TO PHQ9 QUESTIONS 1 AND 2: 0
2. FEELING DOWN, DEPRESSED OR HOPELESS: NOT AT ALL

## 2025-05-09 ASSESSMENT — PAIN SCALES - GENERAL: PAINLEVEL_OUTOF10: 5

## 2025-05-09 NOTE — ASSESSMENT & PLAN NOTE
- UA collected in office today was negative  - patient reports having to run to the bathroom when she feels the urge to urinate, has had incontinence due to urgency  - start oxybutynin 5 mg BID and monitor response  - patient educated on side effects including dry mouth, constipation

## 2025-05-09 NOTE — ASSESSMENT & PLAN NOTE
- using triamcinolone 0.1% cream for the last few weeks with improvement   - Continue triamcinolone 0.1% BID PRN to rash- refilled today  - patient educated on using emollient such as Gold Bond or Eucerin for dry skin

## 2025-05-27 ENCOUNTER — TELEPHONE (OUTPATIENT)
Dept: PRIMARY CARE | Facility: CLINIC | Age: 80
End: 2025-05-27
Payer: MEDICARE

## 2025-05-27 DIAGNOSIS — R35.0 URINARY FREQUENCY: Primary | ICD-10-CM

## 2025-05-27 RX ORDER — TROSPIUM CHLORIDE 20 MG/1
20 TABLET, FILM COATED ORAL 2 TIMES DAILY
Qty: 60 TABLET | Refills: 1 | Status: CANCELLED | OUTPATIENT
Start: 2025-05-27

## 2025-05-27 RX ORDER — OXYBUTYNIN CHLORIDE 5 MG/1
5 TABLET, EXTENDED RELEASE ORAL DAILY
Qty: 30 TABLET | Refills: 1 | Status: SHIPPED | OUTPATIENT
Start: 2025-05-27

## 2025-05-28 ENCOUNTER — TELEPHONE (OUTPATIENT)
Dept: PRIMARY CARE | Facility: CLINIC | Age: 80
End: 2025-05-28
Payer: MEDICARE

## 2025-06-07 LAB — NONINV COLON CA DNA+OCC BLD SCRN STL QL: NEGATIVE

## 2025-06-18 DIAGNOSIS — R35.0 URINARY FREQUENCY: ICD-10-CM

## 2025-06-18 RX ORDER — OXYBUTYNIN CHLORIDE 5 MG/1
5 TABLET, EXTENDED RELEASE ORAL DAILY
Qty: 90 TABLET | Refills: 1 | Status: SHIPPED | OUTPATIENT
Start: 2025-06-18

## 2025-06-21 DIAGNOSIS — I10 ESSENTIAL (PRIMARY) HYPERTENSION: ICD-10-CM

## 2025-06-23 RX ORDER — OLMESARTAN MEDOXOMIL AND HYDROCHLOROTHIAZIDE 20/12.5 20; 12.5 MG/1; MG/1
1 TABLET ORAL DAILY
Qty: 90 TABLET | Refills: 0 | Status: SHIPPED | OUTPATIENT
Start: 2025-06-23

## 2025-07-14 DIAGNOSIS — I10 ESSENTIAL (PRIMARY) HYPERTENSION: ICD-10-CM

## 2025-07-14 RX ORDER — OLMESARTAN MEDOXOMIL AND HYDROCHLOROTHIAZIDE 20/12.5 20; 12.5 MG/1; MG/1
1 TABLET ORAL DAILY
Qty: 90 TABLET | Refills: 0 | Status: SHIPPED | OUTPATIENT
Start: 2025-07-14